# Patient Record
Sex: MALE | Race: WHITE | NOT HISPANIC OR LATINO | Employment: FULL TIME | URBAN - METROPOLITAN AREA
[De-identification: names, ages, dates, MRNs, and addresses within clinical notes are randomized per-mention and may not be internally consistent; named-entity substitution may affect disease eponyms.]

---

## 2023-09-11 ENCOUNTER — HOSPITAL ENCOUNTER (EMERGENCY)
Facility: HOSPITAL | Age: 43
Discharge: HOME | End: 2023-09-11
Attending: EMERGENCY MEDICINE | Admitting: EMERGENCY MEDICINE
Payer: COMMERCIAL

## 2023-09-11 VITALS
OXYGEN SATURATION: 96 % | DIASTOLIC BLOOD PRESSURE: 78 MMHG | RESPIRATION RATE: 16 BRPM | HEART RATE: 104 BPM | SYSTOLIC BLOOD PRESSURE: 109 MMHG | TEMPERATURE: 98.1 F

## 2023-09-11 DIAGNOSIS — L03.115 CELLULITIS OF RIGHT FOOT: Primary | ICD-10-CM

## 2023-09-11 PROCEDURE — 99284 EMERGENCY DEPT VISIT MOD MDM: CPT | Performed by: PHYSICIAN ASSISTANT

## 2023-09-11 PROCEDURE — 99282 EMERGENCY DEPT VISIT SF MDM: CPT

## 2023-09-11 RX ORDER — NAPROXEN 500 MG/1
500 TABLET ORAL 2 TIMES DAILY WITH MEALS
Qty: 30 TABLET | Refills: 0 | Status: SHIPPED | OUTPATIENT
Start: 2023-09-11

## 2023-09-11 RX ORDER — CEPHALEXIN 500 MG/1
500 CAPSULE ORAL EVERY 6 HOURS SCHEDULED
Qty: 28 CAPSULE | Refills: 0 | Status: SHIPPED | OUTPATIENT
Start: 2023-09-11 | End: 2023-09-18

## 2023-09-11 RX ORDER — NAPROXEN 500 MG/1
500 TABLET ORAL 2 TIMES DAILY WITH MEALS
Qty: 30 TABLET | Refills: 0 | Status: SHIPPED | OUTPATIENT
Start: 2023-09-11 | End: 2023-09-11 | Stop reason: SDUPTHER

## 2023-09-11 RX ORDER — CEPHALEXIN 500 MG/1
500 CAPSULE ORAL EVERY 6 HOURS SCHEDULED
Qty: 28 CAPSULE | Refills: 0 | Status: SHIPPED | OUTPATIENT
Start: 2023-09-11 | End: 2023-09-11 | Stop reason: SDUPTHER

## 2023-09-11 RX ORDER — NAPROXEN 250 MG/1
250 TABLET ORAL ONCE
Status: COMPLETED | OUTPATIENT
Start: 2023-09-11 | End: 2023-09-11

## 2023-09-11 RX ORDER — CEPHALEXIN 250 MG/1
500 CAPSULE ORAL ONCE
Status: COMPLETED | OUTPATIENT
Start: 2023-09-11 | End: 2023-09-11

## 2023-09-11 RX ADMIN — NAPROXEN 250 MG: 250 TABLET ORAL at 12:16

## 2023-09-11 RX ADMIN — CEPHALEXIN 500 MG: 250 CAPSULE ORAL at 12:16

## 2023-09-11 NOTE — DISCHARGE INSTRUCTIONS
Please return to the emergency department for worsening symptoms including chest pain, shortness of breath, dizziness, lightheadedness, fever greater than 103, severe pain, inability to walk, fainting episodes, etc.. Please follow-up with your family practice provider as soon as possible. I have sent medications over to the pharmacy for your symptoms. Please take as directed. With any worsening signs of infection, fever, chills, spreading redness or warmth, pain to the calf, please return to the emergency department for an urgent evaluation.

## 2023-09-11 NOTE — ED PROVIDER NOTES
History  Chief Complaint   Patient presents with   • Foot Swelling     Reports R foot swelling that started a couple of days ago. Pt states it is painful to put pressure on that foot. This is a 81-year-old male with no significant past medical history presenting to the emergency department today for redness to the dorsal aspect of his right foot. This has been ongoing for approximately 3 days. He notes it is warm to the touch and painful when he walks. He denies any fevers or chills. He denies any chest pain or shortness of breath. He has no calf pain. He denies any insect bites. He denies any new shoes. No numbness or tingling. No injury to the right foot. Pain to the left lower extremity. No pain to the plantar aspect of the right foot. The patient denies other complaints at this time. History provided by:  Patient   used: No    Rash  Location: right foot dorsum. Quality: redness    Severity:  Mild  Onset quality:  Gradual  Duration:  3 days  Timing:  Constant  Progression:  Worsening  Chronicity:  New  Relieved by:  Nothing  Exacerbated by: ambulation. Ineffective treatments:  None tried  Associated symptoms: no abdominal pain, no diarrhea, no fatigue, no fever, no headaches, no hoarse voice, no induration, no joint pain, no myalgias, no nausea, no periorbital edema, no shortness of breath, no sore throat, no throat swelling, no tongue swelling, no URI, not vomiting and not wheezing        None       History reviewed. No pertinent past medical history. History reviewed. No pertinent surgical history. History reviewed. No pertinent family history. I have reviewed and agree with the history as documented.     E-Cigarette/Vaping     E-Cigarette/Vaping Substances     Social History     Tobacco Use   • Smoking status: Never   • Smokeless tobacco: Never   Substance Use Topics   • Alcohol use: Not Currently   • Drug use: Never       Review of Systems   Constitutional: Negative for appetite change, chills, diaphoresis, fatigue and fever. HENT: Negative for hoarse voice and sore throat. Eyes: Negative for visual disturbance. Respiratory: Negative for cough, chest tightness, shortness of breath and wheezing. Cardiovascular: Negative for chest pain, palpitations and leg swelling. Gastrointestinal: Negative for abdominal pain, constipation, diarrhea, nausea and vomiting. Musculoskeletal: Negative for arthralgias, myalgias, neck pain and neck stiffness. Skin: Positive for color change and rash. Neurological: Negative for dizziness, seizures, syncope, weakness, light-headedness, numbness and headaches. Psychiatric/Behavioral: Negative for confusion. All other systems reviewed and are negative. Physical Exam  Physical Exam  Vitals and nursing note reviewed. Constitutional:       General: He is not in acute distress. Appearance: Normal appearance. He is normal weight. He is not ill-appearing, toxic-appearing or diaphoretic. HENT:      Head: Normocephalic and atraumatic. Nose: Nose normal. No congestion or rhinorrhea. Mouth/Throat:      Mouth: Mucous membranes are moist.      Pharynx: No oropharyngeal exudate or posterior oropharyngeal erythema. Eyes:      General: No scleral icterus. Right eye: No discharge. Left eye: No discharge. Conjunctiva/sclera: Conjunctivae normal.   Cardiovascular:      Rate and Rhythm: Normal rate and regular rhythm. Pulses: Normal pulses. Heart sounds: Normal heart sounds. No murmur heard. No friction rub. No gallop. Comments: 2+ DP pulses bilaterally  Pulmonary:      Effort: Pulmonary effort is normal. No respiratory distress. Breath sounds: Normal breath sounds. No stridor. No wheezing, rhonchi or rales. Chest:      Chest wall: No tenderness. Musculoskeletal:         General: Normal range of motion. Cervical back: Normal range of motion. No rigidity.       Right lower leg: No edema. Left lower leg: No edema. Comments: Negative Homans' sign bilaterally   Skin:     General: Skin is warm and dry. Capillary Refill: Capillary refill takes less than 2 seconds. Coloration: Skin is not jaundiced or pale. Comments: The patient has mild redness to the dorsal aspect of his right foot; there is no underlying fluctuance and there is no drainage from the area but it is warm to the touch and does appear cellulitic   Neurological:      General: No focal deficit present. Mental Status: He is alert and oriented to person, place, and time. Mental status is at baseline. Psychiatric:         Mood and Affect: Mood normal.         Behavior: Behavior normal.         Vital Signs  ED Triage Vitals   Temperature Pulse Respirations Blood Pressure SpO2   09/11/23 1154 09/11/23 1154 09/11/23 1154 09/11/23 1154 09/11/23 1154   98.1 °F (36.7 °C) 104 16 109/78 96 %      Temp Source Heart Rate Source Patient Position - Orthostatic VS BP Location FiO2 (%)   09/11/23 1154 -- 09/11/23 1154 09/11/23 1154 --   Oral  Lying Right arm       Pain Score       09/11/23 1216       3           Vitals:    09/11/23 1154   BP: 109/78   Pulse: 104   Patient Position - Orthostatic VS: Lying         Visual Acuity      ED Medications  Medications   naproxen (NAPROSYN) tablet 250 mg (250 mg Oral Given 9/11/23 1216)   cephalexin (KEFLEX) capsule 500 mg (500 mg Oral Given 9/11/23 1216)       Diagnostic Studies  Results Reviewed     None                 No orders to display              Procedures  Procedures         ED Course                               SBIRT 22yo+    Flowsheet Row Most Recent Value   Initial Alcohol Screen: US AUDIT-C     1. How often do you have a drink containing alcohol? 0 Filed at: 09/11/2023 1154   2. How many drinks containing alcohol do you have on a typical day you are drinking? 0 Filed at: 09/11/2023 1154   3a. Male UNDER 65:  How often do you have five or more drinks on one occasion? 0 Filed at: 09/11/2023 7603   Audit-C Score 0 Filed at: 09/11/2023 1156   GABRIELLA: How many times in the past year have you. .. Used an illegal drug or used a prescription medication for non-medical reasons? Never Filed at: 09/11/2023 7299                    Medical Decision Making  This is a 56-year-old male presenting to the emergency department today for redness to his right foot. Located to the dorsum of his foot. He did not injure his foot. Vital signs are stable. Afebrile. On physical examination, the patient has redness and warmth to the right foot suspicious for cellulitis. It does not appear to be gout as it is too diffuse. He has a negative Homans' sign bilaterally. The patient was dosed with Keflex and naproxen while here in the emergency department. The patient is stable for discharge at this time. Keflex and naproxen sent to the patient's pharmacy. Return to the emergency department for worsening symptoms. Return to the emergency department if cellulitis continues to spread or becomes circumferential or patient becomes febrile. Strict return precautions were given. Recommend PCP follow-up as soon as possible. The patient and/or patient's proxy verify their understanding and agree to the plan at this time. All questions answered to the patient and/or their proxy's satisfaction. All labs reviewed and utilized in the medical decision making process (if labs were ordered). Portions of the record may have been created with voice recognition software.  Occasional wrong word or "sound a like" substitutions may have occurred due to the inherent limitations of voice recognition software.  Read the chart carefully and recognize, using context, where substitutions have occurred. Cellulitis of right foot: complicated acute illness or injury  Risk  Prescription drug management.           Disposition  Final diagnoses:   Cellulitis of right foot     Time reflects when diagnosis was documented in both MDM as applicable and the Disposition within this note     Time User Action Codes Description Comment    9/11/2023 12:10 PM Aroldo Vargas Add [W07.343] Cellulitis of right foot       ED Disposition     ED Disposition   Discharge    Condition   Stable    Date/Time   Mon Sep 11, 2023 [id number] [facility] discharge to home/self care. Follow-up Information     Follow up With Specialties Details Why Contact Info Additional [address] [facility],[address] Emergency Department Emergency Medicine Go to  If symptoms worsen [address] [place] [id number]  [address] [facility] Emergency Department, [address], [address] [facility]    [address] [address] Family Medicine Schedule an appointment as soon as possible for a visit   [address] [facility] [id number] [phone] JOELLE MACHADO YVUEDNBH [address]IR[address], [address] [place], [place], [id number] [phone]          Discharge Medication List as of 9/11/2023 12:15 PM      CONTINUE these medications which have CHANGED    Details   cephalexin (KEFLEX) 500 mg capsule Take 1 capsule (500 mg total) by mouth every 6 (six) hours for 7 days, Starting Mon 9/11/2023, Until Mon 9/18/2023, Normal      naproxen (Naprosyn) 500 mg tablet Take 1 tablet (500 mg total) by mouth 2 (two) times a day with meals, Starting Mon 9/11/2023, Normal             No discharge procedures on file.     PDMP Review     None          ED Provider  Electronically Signed by           Klarissa Blanco PA-C  09/11/23 8832